# Patient Record
(demographics unavailable — no encounter records)

---

## 2017-01-25 NOTE — REPMRS
Patient History

The patient states she had a clinical breast exam in 01/17

Family history of breast cancer in paternal grandmother at age 50

or over.

Took hormonal contraceptives for 10 years.

 

Digital Woman Screen Mammo: January 25, 2017 - Exam #: 

[id number]

Bilateral CC and MLO view(s) were taken.

 

Technologist: Meron Ordoñez, Technologist

Prior study comparison: December 22, 2015, digital woman screen 

mammo performed at [facility].  October 31, 2014, 

right breast digital mammo diagnostic unilateral, performed at 

[facility].  October 21, 2014, digital woman screen

mammo performed at [facility].

 

FINDINGS: There are scattered fibroglandular densities.  There is

a moderate amount of residual fibroglandular tissue which is 

fairly symmetric. There is no interval development of dominant 

mass, architectural distortion, or clustered microcalcification 

typical of malignancy. There has been no change in the appearance

of the mammogram from the prior studies.

 

ASSESSMENT: BI-RADS/ACR category 1 mammogram. Negative.

 

Recommendation

Routine screening mammogram of both breasts in 1 year (for women 

over age 40).

 

This mammogram was interpreted with the aid of an FDA-approved 

computer-aided dectection system.

 

Electronically Signed By: Gustavo Khan MD 01/25/17 0917